# Patient Record
Sex: FEMALE | Race: WHITE | NOT HISPANIC OR LATINO | URBAN - METROPOLITAN AREA
[De-identification: names, ages, dates, MRNs, and addresses within clinical notes are randomized per-mention and may not be internally consistent; named-entity substitution may affect disease eponyms.]

---

## 2017-03-01 PROBLEM — Z00.00 ENCOUNTER FOR PREVENTIVE HEALTH EXAMINATION: Status: ACTIVE | Noted: 2017-03-01

## 2017-07-18 ENCOUNTER — OUTPATIENT (OUTPATIENT)
Dept: ADMINISTRATIVE | Facility: HOSPITAL | Age: 37
LOS: 1 days | Discharge: HOME | End: 2017-07-18

## 2017-07-18 DIAGNOSIS — O99.89 OTHER SPECIFIED DISEASES AND CONDITIONS COMPLICATING PREGNANCY, CHILDBIRTH AND THE PUERPERIUM: ICD-10-CM

## 2017-07-19 ENCOUNTER — INPATIENT (INPATIENT)
Facility: HOSPITAL | Age: 37
LOS: 1 days | Discharge: HOME | End: 2017-07-21
Attending: OBSTETRICS & GYNECOLOGY | Admitting: OBSTETRICS & GYNECOLOGY

## 2017-07-19 DIAGNOSIS — O99.89 OTHER SPECIFIED DISEASES AND CONDITIONS COMPLICATING PREGNANCY, CHILDBIRTH AND THE PUERPERIUM: ICD-10-CM

## 2017-07-24 DIAGNOSIS — Z3A.39 39 WEEKS GESTATION OF PREGNANCY: ICD-10-CM

## 2017-07-24 DIAGNOSIS — F41.9 ANXIETY DISORDER, UNSPECIFIED: ICD-10-CM

## 2017-07-24 DIAGNOSIS — Z34.83 ENCOUNTER FOR SUPERVISION OF OTHER NORMAL PREGNANCY, THIRD TRIMESTER: ICD-10-CM

## 2017-08-14 DIAGNOSIS — O26.893 OTHER SPECIFIED PREGNANCY RELATED CONDITIONS, THIRD TRIMESTER: ICD-10-CM

## 2022-06-15 VITALS — WEIGHT: 141 LBS | SYSTOLIC BLOOD PRESSURE: 124 MMHG | DIASTOLIC BLOOD PRESSURE: 76 MMHG

## 2022-09-07 ENCOUNTER — APPOINTMENT (OUTPATIENT)
Dept: OBGYN | Facility: CLINIC | Age: 42
End: 2022-09-07
Payer: COMMERCIAL

## 2022-09-07 ENCOUNTER — NON-APPOINTMENT (OUTPATIENT)
Age: 42
End: 2022-09-07

## 2022-09-07 VITALS
DIASTOLIC BLOOD PRESSURE: 58 MMHG | SYSTOLIC BLOOD PRESSURE: 91 MMHG | HEIGHT: 65 IN | BODY MASS INDEX: 25.33 KG/M2 | WEIGHT: 152 LBS

## 2022-09-07 DIAGNOSIS — Z78.9 OTHER SPECIFIED HEALTH STATUS: ICD-10-CM

## 2022-09-07 DIAGNOSIS — Z97.5 PRESENCE OF (INTRAUTERINE) CONTRACEPTIVE DEVICE: ICD-10-CM

## 2022-09-07 LAB
BILIRUB UR QL STRIP: NORMAL
CLARITY UR: CLEAR
COLLECTION METHOD: NORMAL
GLUCOSE UR-MCNC: NORMAL
HCG UR QL: 0.2 EU/DL
HGB UR QL STRIP.AUTO: NORMAL
KETONES UR-MCNC: NORMAL
LEUKOCYTE ESTERASE UR QL STRIP: NORMAL
NITRITE UR QL STRIP: NORMAL
PH UR STRIP: 6
PROT UR STRIP-MCNC: NORMAL
SP GR UR STRIP: 1.03

## 2022-09-07 PROCEDURE — 81002 URINALYSIS NONAUTO W/O SCOPE: CPT

## 2022-09-07 PROCEDURE — 0501F PRENATAL FLOW SHEET: CPT | Mod: NC

## 2022-09-07 PROCEDURE — 76816 OB US FOLLOW-UP PER FETUS: CPT

## 2022-09-07 RX ORDER — SERTRALINE HYDROCHLORIDE 25 MG/1
TABLET, FILM COATED ORAL
Refills: 0 | Status: ACTIVE | COMMUNITY

## 2022-09-07 RX ORDER — ONDANSETRON 8 MG/1
8 TABLET, ORALLY DISINTEGRATING ORAL
Refills: 0 | Status: ACTIVE | COMMUNITY

## 2022-09-07 RX ORDER — LAMOTRIGINE 25 MG/1
TABLET ORAL
Refills: 0 | Status: ACTIVE | COMMUNITY

## 2022-09-20 ENCOUNTER — NON-APPOINTMENT (OUTPATIENT)
Age: 42
End: 2022-09-20

## 2022-09-20 RX ORDER — NITROFURANTOIN (MONOHYDRATE/MACROCRYSTALS) 25; 75 MG/1; MG/1
100 CAPSULE ORAL
Qty: 10 | Refills: 0 | Status: ACTIVE | COMMUNITY
Start: 2022-09-20 | End: 1900-01-01

## 2022-10-19 ENCOUNTER — APPOINTMENT (OUTPATIENT)
Dept: ANTEPARTUM | Facility: CLINIC | Age: 42
End: 2022-10-19

## 2022-10-19 ENCOUNTER — ASOB RESULT (OUTPATIENT)
Age: 42
End: 2022-10-19

## 2022-10-19 PROCEDURE — 76805 OB US >/= 14 WKS SNGL FETUS: CPT

## 2022-11-07 ENCOUNTER — APPOINTMENT (OUTPATIENT)
Dept: OBGYN | Facility: CLINIC | Age: 42
End: 2022-11-07

## 2022-11-07 VITALS — BODY MASS INDEX: 25.96 KG/M2 | DIASTOLIC BLOOD PRESSURE: 74 MMHG | WEIGHT: 156 LBS | SYSTOLIC BLOOD PRESSURE: 111 MMHG

## 2022-11-07 PROCEDURE — 0502F SUBSEQUENT PRENATAL CARE: CPT | Mod: NC

## 2022-11-07 PROCEDURE — 81003 URINALYSIS AUTO W/O SCOPE: CPT | Mod: QW

## 2022-11-29 ENCOUNTER — APPOINTMENT (OUTPATIENT)
Dept: OBGYN | Facility: CLINIC | Age: 42
End: 2022-11-29
Payer: COMMERCIAL

## 2022-11-29 VITALS — SYSTOLIC BLOOD PRESSURE: 126 MMHG | DIASTOLIC BLOOD PRESSURE: 74 MMHG | WEIGHT: 153 LBS | BODY MASS INDEX: 25.46 KG/M2

## 2022-11-29 LAB
BILIRUB UR QL STRIP: NORMAL
GLUCOSE UR-MCNC: NORMAL
HCG UR QL: 0.2 EU/DL
HGB UR QL STRIP.AUTO: NORMAL
KETONES UR-MCNC: NORMAL
LEUKOCYTE ESTERASE UR QL STRIP: NORMAL
NITRITE UR QL STRIP: NORMAL
PH UR STRIP: 6.5
PROT UR STRIP-MCNC: NORMAL
SP GR UR STRIP: 1

## 2022-11-29 PROCEDURE — 81003 URINALYSIS AUTO W/O SCOPE: CPT | Mod: QW

## 2022-11-29 PROCEDURE — 76816 OB US FOLLOW-UP PER FETUS: CPT

## 2022-11-29 PROCEDURE — 0502F SUBSEQUENT PRENATAL CARE: CPT | Mod: NC

## 2022-12-07 ENCOUNTER — OUTPATIENT (OUTPATIENT)
Dept: OUTPATIENT SERVICES | Facility: HOSPITAL | Age: 42
LOS: 1 days | Discharge: HOME | End: 2022-12-07
Payer: COMMERCIAL

## 2022-12-07 VITALS — HEART RATE: 86 BPM | DIASTOLIC BLOOD PRESSURE: 63 MMHG | SYSTOLIC BLOOD PRESSURE: 105 MMHG

## 2022-12-07 VITALS
HEART RATE: 86 BPM | TEMPERATURE: 98 F | DIASTOLIC BLOOD PRESSURE: 63 MMHG | RESPIRATION RATE: 18 BRPM | SYSTOLIC BLOOD PRESSURE: 105 MMHG

## 2022-12-07 LAB
APPEARANCE UR: CLEAR — SIGNIFICANT CHANGE UP
BILIRUB UR-MCNC: NEGATIVE — SIGNIFICANT CHANGE UP
COLOR SPEC: YELLOW — SIGNIFICANT CHANGE UP
DIFF PNL FLD: NEGATIVE — SIGNIFICANT CHANGE UP
GLUCOSE UR QL: NEGATIVE — SIGNIFICANT CHANGE UP
KETONES UR-MCNC: NEGATIVE — SIGNIFICANT CHANGE UP
LEUKOCYTE ESTERASE UR-ACNC: NEGATIVE — SIGNIFICANT CHANGE UP
NITRITE UR-MCNC: NEGATIVE — SIGNIFICANT CHANGE UP
PH UR: 6 — SIGNIFICANT CHANGE UP (ref 5–8)
PROT UR-MCNC: SIGNIFICANT CHANGE UP
SP GR SPEC: 1.01 — SIGNIFICANT CHANGE UP (ref 1.01–1.03)
UROBILINOGEN FLD QL: SIGNIFICANT CHANGE UP

## 2022-12-07 PROCEDURE — 99213 OFFICE O/P EST LOW 20 MIN: CPT | Mod: GC

## 2022-12-07 NOTE — OB PROVIDER TRIAGE NOTE - HISTORY OF PRESENT ILLNESS
43 yo  @35w1d, ALEKS 1/10/23 dated by first trimester sonogram, presented to L&D after experiencing leakage of clear fluid since 0200. Reports she has worn a pad since which has been damp. Reports intermittent contractions earlier in the day 10/10 intensity. Reports her contractions have since resolved and does not report any discomfort. Denies vaginal bleeding. Reports good fetal movement. Reports dysuria for the past 3 days. Denies hematuria, urinary urgency. Reports 3 days of white and green vaginal discharge with mild irritation. Previously on Zoloft this pregnancy, but discontinued it 3-4 months ago. Denies fevers, chills, nausea, vomiting, diarrhea, constipation, chest pain, shortness of breath. GBS unknown.     Last BM: this morning  Last PO intake:   Last intercourse: several weeks ago

## 2022-12-07 NOTE — OB PROVIDER TRIAGE NOTE - NSOBPROVIDERNOTE_OBGYN_ALL_OB_FT
41 yo  @35w1d, GBS unknown, c/o LOF, not ruptured, BPP 8/8 with reassuring maternal and fetal status  -r/o UTI, f/u UA, UCx  -f/u GBS, vaginitis  -PTL precautions  -[facility] instructions  -PO maternal hydrations  -follow up at next scheduled appointment on Friday with OBGYN    Dr Larios and Dr Guzman aware

## 2022-12-07 NOTE — OB PROVIDER TRIAGE NOTE - NSHPPHYSICALEXAM_GEN_ALL_CORE
Physical exam:    Vital Signs Last 24 Hrs  T(F): 98.3 (07 Dec 2022 21:47), Max: 98.3 (07 Dec 2022 21:47)  HR: 86 (07 Dec 2022 21:51) (86 - 86)  BP: 105/63 (07 Dec 2022 21:51) (105/63 - 105/63)  RR: 18 (07 Dec 2022 21:47) (18 - 18)  SpO2: --    Gen: NAD, AAOx3  CV: S1S2, RRR, no M/R/G  Pulm: CTAB  Ext: no calf tenderness or edema   Abdomen: soft, gravid, nontender, with nonpalpable contractions, no CVA tenderness, no suprapubic tenderness  Speculum: mild physiologic discharge, no pooling, cervix appeared closed, no bleeding, nitrazine positive, ferning negative.     EFM: 145/mod/pos acc   toco: irregular  SVE: 0/0/-3  Sono: vertex, anterior placenta, MVP 6.4cm, BPP 8/8

## 2022-12-07 NOTE — OB PROVIDER TRIAGE NOTE - NSHPLABSRESULTS_GEN_ALL_CORE
Sonograms:  28w1d: 1164gm (33%), MVP 5.59cm, anterior, no previa, transverse, anatomy wnl    No PNC available

## 2022-12-07 NOTE — OB PROVIDER TRIAGE NOTE - ATTENDING COMMENTS
Pt seen on L&D, she is a 43 yo  @35w1d, GBS unknown, presented c/o possible LOF, she is not ruptured, BPP 8 with reassuring maternal and fetal status, not in labor  -will d/c home  -precautions rev  -f/u vaginal culture, and urine culture   -f/u in office

## 2022-12-08 DIAGNOSIS — O26.893 OTHER SPECIFIED PREGNANCY RELATED CONDITIONS, THIRD TRIMESTER: ICD-10-CM

## 2022-12-08 DIAGNOSIS — O47.03 FALSE LABOR BEFORE 37 COMPLETED WEEKS OF GESTATION, THIRD TRIMESTER: ICD-10-CM

## 2022-12-09 ENCOUNTER — APPOINTMENT (OUTPATIENT)
Dept: OBGYN | Facility: CLINIC | Age: 42
End: 2022-12-09

## 2022-12-09 LAB
CULTURE RESULTS: SIGNIFICANT CHANGE UP
GROUP B BETA STREP DNA (PCR): SIGNIFICANT CHANGE UP
SOURCE GROUP B STREP: SIGNIFICANT CHANGE UP
SPECIMEN SOURCE: SIGNIFICANT CHANGE UP

## 2022-12-13 ENCOUNTER — APPOINTMENT (OUTPATIENT)
Dept: OBGYN | Facility: CLINIC | Age: 42
End: 2022-12-13
Payer: COMMERCIAL

## 2022-12-13 VITALS — WEIGHT: 153 LBS | DIASTOLIC BLOOD PRESSURE: 78 MMHG | BODY MASS INDEX: 25.46 KG/M2 | SYSTOLIC BLOOD PRESSURE: 113 MMHG

## 2022-12-13 DIAGNOSIS — N39.0 URINARY TRACT INFECTION, SITE NOT SPECIFIED: ICD-10-CM

## 2022-12-13 LAB
BILIRUB UR QL STRIP: NORMAL
GLUCOSE UR-MCNC: NORMAL
HCG UR QL: 1 EU/DL
HGB UR QL STRIP.AUTO: NORMAL
KETONES UR-MCNC: NORMAL
LEUKOCYTE ESTERASE UR QL STRIP: NORMAL
NITRITE UR QL STRIP: POSITIVE
PH UR STRIP: 5.5
PROT UR STRIP-MCNC: NORMAL
SP GR UR STRIP: 1.03

## 2022-12-13 PROCEDURE — 81003 URINALYSIS AUTO W/O SCOPE: CPT | Mod: QW

## 2022-12-13 PROCEDURE — 0502F SUBSEQUENT PRENATAL CARE: CPT | Mod: NC

## 2022-12-13 PROCEDURE — 36415 COLL VENOUS BLD VENIPUNCTURE: CPT

## 2022-12-13 PROCEDURE — 76816 OB US FOLLOW-UP PER FETUS: CPT

## 2022-12-14 LAB
A VAGINAE DNA VAG QL NAA+PROBE: SIGNIFICANT CHANGE UP
ALBUMIN SERPL ELPH-MCNC: 3.3 G/DL
ALP BLD-CCNC: 190 U/L
ALT SERPL-CCNC: 15 U/L
ANION GAP SERPL CALC-SCNC: 11 MMOL/L
AST SERPL-CCNC: 20 U/L
BASOPHILS # BLD AUTO: 0.02 K/UL
BASOPHILS NFR BLD AUTO: 0.3 %
BILIRUB SERPL-MCNC: 0.5 MG/DL
BUN SERPL-MCNC: 4 MG/DL
BVAB2 DNA VAG QL NAA+PROBE: SIGNIFICANT CHANGE UP
C ALBICANS DNA VAG QL NAA+PROBE: NEGATIVE — SIGNIFICANT CHANGE UP
C GLABRATA DNA VAG QL NAA+PROBE: NEGATIVE — SIGNIFICANT CHANGE UP
C KRUSEI DNA VAG QL NAA+PROBE: NEGATIVE — SIGNIFICANT CHANGE UP
C LUSITANIAE DNA VAG QL NAA+PROBE: NEGATIVE — SIGNIFICANT CHANGE UP
C TRACH RRNA SPEC QL NAA+PROBE: NEGATIVE — SIGNIFICANT CHANGE UP
CALCIUM SERPL-MCNC: 10.1 MG/DL
CHLORIDE SERPL-SCNC: 99 MMOL/L
CO2 SERPL-SCNC: 20 MMOL/L
CREAT SERPL-MCNC: 0.59 MG/DL
EGFR: 117 ML/MIN/1.73M2
EOSINOPHIL # BLD AUTO: 0.06 K/UL
EOSINOPHIL NFR BLD AUTO: 0.9 %
GLUCOSE SERPL-MCNC: 84 MG/DL
HCT VFR BLD CALC: 30.6 %
HCV AB SER QL: NONREACTIVE
HCV S/CO RATIO: 0.08 S/CO
HGB BLD-MCNC: 9.6 G/DL
HIV1+2 AB SPEC QL IA.RAPID: NONREACTIVE
IMM GRANULOCYTES NFR BLD AUTO: 0.8 %
LYMPHOCYTES # BLD AUTO: 0.72 K/UL
LYMPHOCYTES NFR BLD AUTO: 11.2 %
MAN DIFF?: NORMAL
MCHC RBC-ENTMCNC: 28 PG
MCHC RBC-ENTMCNC: 31.4 GM/DL
MCV RBC AUTO: 89.2 FL
MEGA1 DNA VAG QL NAA+PROBE: SIGNIFICANT CHANGE UP
MONOCYTES # BLD AUTO: 0.43 K/UL
MONOCYTES NFR BLD AUTO: 6.7 %
N GONORRHOEA RRNA SPEC QL NAA+PROBE: NEGATIVE — SIGNIFICANT CHANGE UP
NEUTROPHILS # BLD AUTO: 5.15 K/UL
NEUTROPHILS NFR BLD AUTO: 80.1 %
PLATELET # BLD AUTO: 206 K/UL
POTASSIUM SERPL-SCNC: 3.9 MMOL/L
PROT SERPL-MCNC: 6.2 G/DL
RBC # BLD: 3.43 M/UL
RBC # FLD: 13.2 %
SODIUM SERPL-SCNC: 131 MMOL/L
T PALLIDUM AB SER QL IA: NEGATIVE
T VAGINALIS RRNA SPEC QL NAA+PROBE: NEGATIVE — SIGNIFICANT CHANGE UP
WBC # FLD AUTO: 6.43 K/UL

## 2022-12-15 ENCOUNTER — NON-APPOINTMENT (OUTPATIENT)
Age: 42
End: 2022-12-15

## 2022-12-15 LAB — BILE AC SER-MCNC: 18.5 UMOL/L

## 2022-12-16 ENCOUNTER — APPOINTMENT (OUTPATIENT)
Dept: OBGYN | Facility: CLINIC | Age: 42
End: 2022-12-16
Payer: COMMERCIAL

## 2022-12-16 ENCOUNTER — NON-APPOINTMENT (OUTPATIENT)
Age: 42
End: 2022-12-16

## 2022-12-16 VITALS — BODY MASS INDEX: 24.96 KG/M2 | DIASTOLIC BLOOD PRESSURE: 73 MMHG | WEIGHT: 150 LBS | SYSTOLIC BLOOD PRESSURE: 114 MMHG

## 2022-12-16 LAB
B-HEM STREP SPEC QL CULT: NORMAL
BACTERIA UR CULT: NORMAL
BILIRUB UR QL STRIP: NORMAL
GLUCOSE UR-MCNC: NORMAL
HCG UR QL: 2 EU/DL
HGB UR QL STRIP.AUTO: NORMAL
KETONES UR-MCNC: NORMAL
LEUKOCYTE ESTERASE UR QL STRIP: NORMAL
NITRITE UR QL STRIP: NORMAL
PH UR STRIP: 7.5
PROT UR STRIP-MCNC: 30
SP GR UR STRIP: 1.01

## 2022-12-16 PROCEDURE — 81003 URINALYSIS AUTO W/O SCOPE: CPT | Mod: QW

## 2022-12-16 PROCEDURE — 0502F SUBSEQUENT PRENATAL CARE: CPT | Mod: NC

## 2022-12-16 PROCEDURE — 76818 FETAL BIOPHYS PROFILE W/NST: CPT

## 2022-12-16 PROCEDURE — 36415 COLL VENOUS BLD VENIPUNCTURE: CPT

## 2022-12-16 RX ORDER — ONDANSETRON 4 MG/1
4 TABLET ORAL 3 TIMES DAILY
Qty: 28 | Refills: 0 | Status: ACTIVE | COMMUNITY
Start: 2022-12-16 | End: 1900-01-01

## 2022-12-16 RX ORDER — LURASIDONE HYDROCHLORIDE 40 MG/1
40 TABLET, FILM COATED ORAL
Qty: 30 | Refills: 0 | Status: DISCONTINUED | COMMUNITY
Start: 2022-06-17

## 2022-12-16 RX ORDER — URSODIOL 300 MG/1
300 CAPSULE ORAL
Qty: 30 | Refills: 0 | Status: ACTIVE | COMMUNITY
Start: 2022-12-16 | End: 1900-01-01

## 2022-12-16 RX ORDER — SERTRALINE HYDROCHLORIDE 50 MG/1
50 TABLET, FILM COATED ORAL
Qty: 30 | Refills: 0 | Status: DISCONTINUED | COMMUNITY
Start: 2022-11-22

## 2022-12-19 ENCOUNTER — INPATIENT (INPATIENT)
Facility: HOSPITAL | Age: 42
LOS: 1 days | Discharge: LEFT BEFORE DISCHARGE | End: 2022-12-21
Attending: STUDENT IN AN ORGANIZED HEALTH CARE EDUCATION/TRAINING PROGRAM | Admitting: STUDENT IN AN ORGANIZED HEALTH CARE EDUCATION/TRAINING PROGRAM
Payer: COMMERCIAL

## 2022-12-19 ENCOUNTER — APPOINTMENT (OUTPATIENT)
Dept: OBGYN | Facility: CLINIC | Age: 42
End: 2022-12-19
Payer: COMMERCIAL

## 2022-12-19 VITALS
HEIGHT: 65 IN | BODY MASS INDEX: 25.49 KG/M2 | HEART RATE: 86 BPM | DIASTOLIC BLOOD PRESSURE: 73 MMHG | SYSTOLIC BLOOD PRESSURE: 107 MMHG | WEIGHT: 153 LBS

## 2022-12-19 VITALS — HEIGHT: 66 IN | WEIGHT: 153 LBS

## 2022-12-19 DIAGNOSIS — K83.1 LIVER AND BILIARY TRACT DISORDERS IN PREGNANCY, UNSPECIFIED TRIMESTER: ICD-10-CM

## 2022-12-19 DIAGNOSIS — O09.529 SUPERVISION OF ELDERLY MULTIGRAVIDA, UNSPECIFIED TRIMESTER: ICD-10-CM

## 2022-12-19 DIAGNOSIS — Z34.90 ENCOUNTER FOR SUPERVISION OF NORMAL PREGNANCY, UNSPECIFIED, UNSPECIFIED TRIMESTER: ICD-10-CM

## 2022-12-19 DIAGNOSIS — O26.619 LIVER AND BILIARY TRACT DISORDERS IN PREGNANCY, UNSPECIFIED TRIMESTER: ICD-10-CM

## 2022-12-19 LAB
ALBUMIN SERPL ELPH-MCNC: 3.2 G/DL — LOW (ref 3.5–5.2)
ALP SERPL-CCNC: 198 U/L — HIGH (ref 30–115)
ALT FLD-CCNC: 11 U/L — SIGNIFICANT CHANGE UP (ref 0–41)
ANION GAP SERPL CALC-SCNC: 11 MMOL/L — SIGNIFICANT CHANGE UP (ref 7–14)
APPEARANCE UR: ABNORMAL
AST SERPL-CCNC: 14 U/L — SIGNIFICANT CHANGE UP (ref 0–41)
B-HEM STREP SPEC QL CULT: NORMAL
BASOPHILS # BLD AUTO: 0.01 K/UL — SIGNIFICANT CHANGE UP (ref 0–0.2)
BASOPHILS NFR BLD AUTO: 0.2 % — SIGNIFICANT CHANGE UP (ref 0–1)
BILIRUB SERPL-MCNC: 0.4 MG/DL — SIGNIFICANT CHANGE UP (ref 0.2–1.2)
BILIRUB UR QL STRIP: NORMAL
BILIRUB UR-MCNC: ABNORMAL
BUN SERPL-MCNC: 4 MG/DL — LOW (ref 10–20)
CALCIUM SERPL-MCNC: 10.2 MG/DL — SIGNIFICANT CHANGE UP (ref 8.4–10.5)
CHLORIDE SERPL-SCNC: 100 MMOL/L — SIGNIFICANT CHANGE UP (ref 98–110)
CO2 SERPL-SCNC: 21 MMOL/L — SIGNIFICANT CHANGE UP (ref 17–32)
COLOR SPEC: ABNORMAL
CREAT SERPL-MCNC: 0.6 MG/DL — LOW (ref 0.7–1.5)
DIFF PNL FLD: NEGATIVE — SIGNIFICANT CHANGE UP
EGFR: 116 ML/MIN/1.73M2 — SIGNIFICANT CHANGE UP
EOSINOPHIL # BLD AUTO: 0.11 K/UL — SIGNIFICANT CHANGE UP (ref 0–0.7)
EOSINOPHIL NFR BLD AUTO: 1.7 % — SIGNIFICANT CHANGE UP (ref 0–8)
GLUCOSE SERPL-MCNC: 82 MG/DL — SIGNIFICANT CHANGE UP (ref 70–99)
GLUCOSE UR QL: SIGNIFICANT CHANGE UP
GLUCOSE UR-MCNC: NORMAL
HCG UR QL: 1 EU/DL
HCT VFR BLD CALC: 30.6 % — LOW (ref 37–47)
HGB BLD-MCNC: 9.9 G/DL — LOW (ref 12–16)
HGB UR QL STRIP.AUTO: NORMAL
IMM GRANULOCYTES NFR BLD AUTO: 0.5 % — HIGH (ref 0.1–0.3)
KETONES UR-MCNC: NORMAL
KETONES UR-MCNC: SIGNIFICANT CHANGE UP
LEUKOCYTE ESTERASE UR QL STRIP: NORMAL
LEUKOCYTE ESTERASE UR-ACNC: ABNORMAL
LYMPHOCYTES # BLD AUTO: 1.52 K/UL — SIGNIFICANT CHANGE UP (ref 1.2–3.4)
LYMPHOCYTES # BLD AUTO: 23.9 % — SIGNIFICANT CHANGE UP (ref 20.5–51.1)
MCHC RBC-ENTMCNC: 27.4 PG — SIGNIFICANT CHANGE UP (ref 27–31)
MCHC RBC-ENTMCNC: 32.4 G/DL — SIGNIFICANT CHANGE UP (ref 32–37)
MCV RBC AUTO: 84.8 FL — SIGNIFICANT CHANGE UP (ref 81–99)
MONOCYTES # BLD AUTO: 0.28 K/UL — SIGNIFICANT CHANGE UP (ref 0.1–0.6)
MONOCYTES NFR BLD AUTO: 4.4 % — SIGNIFICANT CHANGE UP (ref 1.7–9.3)
NEUTROPHILS # BLD AUTO: 4.41 K/UL — SIGNIFICANT CHANGE UP (ref 1.4–6.5)
NEUTROPHILS NFR BLD AUTO: 69.3 % — SIGNIFICANT CHANGE UP (ref 42.2–75.2)
NITRITE UR QL STRIP: NORMAL
NITRITE UR-MCNC: NEGATIVE — SIGNIFICANT CHANGE UP
NRBC # BLD: 0 /100 WBCS — SIGNIFICANT CHANGE UP (ref 0–0)
PH UR STRIP: 6
PH UR: 6 — SIGNIFICANT CHANGE UP (ref 5–8)
PLATELET # BLD AUTO: 219 K/UL — SIGNIFICANT CHANGE UP (ref 130–400)
POTASSIUM SERPL-MCNC: 4.2 MMOL/L — SIGNIFICANT CHANGE UP (ref 3.5–5)
POTASSIUM SERPL-SCNC: 4.2 MMOL/L — SIGNIFICANT CHANGE UP (ref 3.5–5)
PROT SERPL-MCNC: 6.2 G/DL — SIGNIFICANT CHANGE UP (ref 6–8)
PROT UR STRIP-MCNC: NORMAL
PROT UR-MCNC: ABNORMAL
RBC # BLD: 3.61 M/UL — LOW (ref 4.2–5.4)
RBC # FLD: 12.9 % — SIGNIFICANT CHANGE UP (ref 11.5–14.5)
SODIUM SERPL-SCNC: 132 MMOL/L — LOW (ref 135–146)
SP GR SPEC: 1.03 — HIGH (ref 1.01–1.03)
SP GR UR STRIP: 1.01
UROBILINOGEN FLD QL: ABNORMAL
WBC # BLD: 6.36 K/UL — SIGNIFICANT CHANGE UP (ref 4.8–10.8)
WBC # FLD AUTO: 6.36 K/UL — SIGNIFICANT CHANGE UP (ref 4.8–10.8)

## 2022-12-19 PROCEDURE — 81003 URINALYSIS AUTO W/O SCOPE: CPT | Mod: QW

## 2022-12-19 PROCEDURE — 76819 FETAL BIOPHYS PROFIL W/O NST: CPT

## 2022-12-19 PROCEDURE — 0502F SUBSEQUENT PRENATAL CARE: CPT | Mod: NC

## 2022-12-19 RX ORDER — OXYTOCIN 10 UNIT/ML
333.33 VIAL (ML) INJECTION
Qty: 20 | Refills: 0 | Status: COMPLETED | OUTPATIENT
Start: 2022-12-19 | End: 2022-12-20

## 2022-12-19 RX ORDER — CITRIC ACID/SODIUM CITRATE 300-500 MG
15 SOLUTION, ORAL ORAL EVERY 6 HOURS
Refills: 0 | Status: DISCONTINUED | OUTPATIENT
Start: 2022-12-19 | End: 2022-12-20

## 2022-12-19 RX ORDER — LURASIDONE HYDROCHLORIDE 40 MG/1
40 TABLET ORAL DAILY
Refills: 0 | Status: DISCONTINUED | OUTPATIENT
Start: 2022-12-20 | End: 2022-12-20

## 2022-12-19 RX ORDER — SODIUM CHLORIDE 9 MG/ML
1000 INJECTION, SOLUTION INTRAVENOUS
Refills: 0 | Status: DISCONTINUED | OUTPATIENT
Start: 2022-12-19 | End: 2022-12-20

## 2022-12-19 RX ORDER — CHLORHEXIDINE GLUCONATE 213 G/1000ML
1 SOLUTION TOPICAL ONCE
Refills: 0 | Status: DISCONTINUED | OUTPATIENT
Start: 2022-12-19 | End: 2022-12-20

## 2022-12-19 RX ORDER — SODIUM CHLORIDE 9 MG/ML
1000 INJECTION, SOLUTION INTRAVENOUS
Refills: 0 | Status: DISCONTINUED | OUTPATIENT
Start: 2022-12-19 | End: 2022-12-19

## 2022-12-19 RX ORDER — CHLORHEXIDINE GLUCONATE 213 G/1000ML
1 SOLUTION TOPICAL ONCE
Refills: 0 | Status: DISCONTINUED | OUTPATIENT
Start: 2022-12-19 | End: 2022-12-19

## 2022-12-19 RX ORDER — INFLUENZA VIRUS VACCINE 15; 15; 15; 15 UG/.5ML; UG/.5ML; UG/.5ML; UG/.5ML
0.5 SUSPENSION INTRAMUSCULAR ONCE
Refills: 0 | Status: DISCONTINUED | OUTPATIENT
Start: 2022-12-19 | End: 2022-12-20

## 2022-12-19 RX ORDER — OXYTOCIN 10 UNIT/ML
333.33 VIAL (ML) INJECTION
Qty: 20 | Refills: 0 | Status: DISCONTINUED | OUTPATIENT
Start: 2022-12-19 | End: 2022-12-19

## 2022-12-19 RX ORDER — CITRIC ACID/SODIUM CITRATE 300-500 MG
15 SOLUTION, ORAL ORAL EVERY 6 HOURS
Refills: 0 | Status: DISCONTINUED | OUTPATIENT
Start: 2022-12-19 | End: 2022-12-19

## 2022-12-19 NOTE — OB PROVIDER H&P - NSHPPHYSICALEXAM_GEN_ALL_CORE
Vital Signs Last 24 Hrs  HR: 95 (19 Dec 2022 22:35) (82 - 95)  BP: 119/74 (19 Dec 2022 22:35) (119/74 - 123/69)    Gen: NAD, sitting comfortably  Abd: Gravid, soft, NT, no palpable ctx  SVE: 1/50/-2, vtx, intact  EFM: 125/mod/+accels  [place]: q4m  Sono: cephalic

## 2022-12-19 NOTE — OB PROVIDER H&P - NS_OBGYNHISTORY_OBGYN_ALL_OB_FT
OBhx: FT  x4, largest 6-14, no complications    Gynhx: denies fibroids, cysts, abnormal pap smears, STDs.

## 2022-12-19 NOTE — OB PROVIDER H&P - HISTORY OF PRESENT ILLNESS
41yo  @36w6d with ALEKS 1/10 by first trimester sonogram presenting to L&D for scheduled IOL for cholestasis of pregnancy. Most recent bile acids on  were 18.5. Denies VB, LOF, ctx.  Good FM.  Denies any other complications with this pregnancy. GBS neg.

## 2022-12-19 NOTE — OB PROVIDER H&P - ASSESSMENT
41yo  @36w6d, GBS neg, for IOL for cholestasis of pregnancy.    - admit to L&D  - cont efm/toco  - clear liquid diet, IVF  - pain management prn  - balloon for IOL  - f/u admission labs, COVID swab, CMP    Dr. Ruelas aware.

## 2022-12-19 NOTE — OB PROVIDER H&P - NSHPLABSRESULTS_GEN_ALL_CORE
Labs:  6/15  measles immune  mumps immune  rubella immune  varicella immune  RPR NR  O pos, antibody neg  HIV NR  HBsAg NR    12/13  HIV NR  bile acids 18.5    Sono:  10/19: 28w1d, EFW 1164g (33%), ant placenta, transverse presentation

## 2022-12-20 ENCOUNTER — APPOINTMENT (OUTPATIENT)
Dept: OBGYN | Facility: CLINIC | Age: 42
End: 2022-12-20

## 2022-12-20 LAB
AMPHET UR-MCNC: POSITIVE
BACTERIA # UR AUTO: NEGATIVE — SIGNIFICANT CHANGE UP
BARBITURATES UR SCN-MCNC: NEGATIVE — SIGNIFICANT CHANGE UP
BENZODIAZ UR-MCNC: NEGATIVE — SIGNIFICANT CHANGE UP
BLD GP AB SCN SERPL QL: SIGNIFICANT CHANGE UP
BUPRENORPHINE SCREEN, URINE RESULT: NEGATIVE — SIGNIFICANT CHANGE UP
COCAINE METAB.OTHER UR-MCNC: NEGATIVE — SIGNIFICANT CHANGE UP
COD CRY URNS QL: ABNORMAL
COMMENT - URINE: SIGNIFICANT CHANGE UP
EPI CELLS # UR: >27 /HPF — HIGH (ref 0–5)
FENTANYL UR QL: NEGATIVE — SIGNIFICANT CHANGE UP
HYALINE CASTS # UR AUTO: 58 /LPF — HIGH (ref 0–7)
L&D DRUG SCREEN, URINE: SIGNIFICANT CHANGE UP
METHADONE UR-MCNC: NEGATIVE — SIGNIFICANT CHANGE UP
OPIATES UR-MCNC: NEGATIVE — SIGNIFICANT CHANGE UP
OXYCODONE UR-MCNC: NEGATIVE — SIGNIFICANT CHANGE UP
PCP UR-MCNC: NEGATIVE — SIGNIFICANT CHANGE UP
PRENATAL SYPHILIS TEST: SIGNIFICANT CHANGE UP
PROPOXYPHENE QUALITATIVE URINE RESULT: NEGATIVE — SIGNIFICANT CHANGE UP
RBC CASTS # UR COMP ASSIST: 5 /HPF — HIGH (ref 0–4)
SARS-COV-2 RNA SPEC QL NAA+PROBE: SIGNIFICANT CHANGE UP
WBC UR QL: 9 /HPF — HIGH (ref 0–5)

## 2022-12-20 PROCEDURE — 59400 OBSTETRICAL CARE: CPT

## 2022-12-20 RX ORDER — DIPHENHYDRAMINE HCL 50 MG
25 CAPSULE ORAL EVERY 6 HOURS
Refills: 0 | Status: COMPLETED | OUTPATIENT
Start: 2022-12-20 | End: 2023-11-18

## 2022-12-20 RX ORDER — KETOROLAC TROMETHAMINE 30 MG/ML
30 SYRINGE (ML) INJECTION ONCE
Refills: 0 | Status: DISCONTINUED | OUTPATIENT
Start: 2022-12-20 | End: 2022-12-20

## 2022-12-20 RX ORDER — OXYCODONE HYDROCHLORIDE 5 MG/1
5 TABLET ORAL
Refills: 0 | Status: DISCONTINUED | OUTPATIENT
Start: 2022-12-20 | End: 2022-12-21

## 2022-12-20 RX ORDER — AER TRAVELER 0.5 G/1
1 SOLUTION RECTAL; TOPICAL EVERY 4 HOURS
Refills: 0 | Status: DISCONTINUED | OUTPATIENT
Start: 2022-12-20 | End: 2022-12-21

## 2022-12-20 RX ORDER — BUTORPHANOL TARTRATE 2 MG/ML
2 INJECTION, SOLUTION INTRAMUSCULAR; INTRAVENOUS ONCE
Refills: 0 | Status: DISCONTINUED | OUTPATIENT
Start: 2022-12-20 | End: 2022-12-20

## 2022-12-20 RX ORDER — OXYCODONE HYDROCHLORIDE 5 MG/1
5 TABLET ORAL ONCE
Refills: 0 | Status: DISCONTINUED | OUTPATIENT
Start: 2022-12-20 | End: 2022-12-21

## 2022-12-20 RX ORDER — DIPHENHYDRAMINE HCL 50 MG
25 CAPSULE ORAL EVERY 6 HOURS
Refills: 0 | Status: DISCONTINUED | OUTPATIENT
Start: 2022-12-20 | End: 2022-12-21

## 2022-12-20 RX ORDER — SODIUM CHLORIDE 9 MG/ML
3 INJECTION INTRAMUSCULAR; INTRAVENOUS; SUBCUTANEOUS EVERY 8 HOURS
Refills: 0 | Status: DISCONTINUED | OUTPATIENT
Start: 2022-12-20 | End: 2022-12-21

## 2022-12-20 RX ORDER — TETANUS TOXOID, REDUCED DIPHTHERIA TOXOID AND ACELLULAR PERTUSSIS VACCINE, ADSORBED 5; 2.5; 8; 8; 2.5 [IU]/.5ML; [IU]/.5ML; UG/.5ML; UG/.5ML; UG/.5ML
0.5 SUSPENSION INTRAMUSCULAR ONCE
Refills: 0 | Status: DISCONTINUED | OUTPATIENT
Start: 2022-12-20 | End: 2022-12-21

## 2022-12-20 RX ORDER — DIBUCAINE 1 %
1 OINTMENT (GRAM) RECTAL EVERY 6 HOURS
Refills: 0 | Status: DISCONTINUED | OUTPATIENT
Start: 2022-12-20 | End: 2022-12-21

## 2022-12-20 RX ORDER — PRAMOXINE HYDROCHLORIDE 150 MG/15G
1 AEROSOL, FOAM RECTAL EVERY 4 HOURS
Refills: 0 | Status: DISCONTINUED | OUTPATIENT
Start: 2022-12-20 | End: 2022-12-21

## 2022-12-20 RX ORDER — BENZOCAINE 10 %
1 GEL (GRAM) MUCOUS MEMBRANE EVERY 6 HOURS
Refills: 0 | Status: DISCONTINUED | OUTPATIENT
Start: 2022-12-20 | End: 2022-12-21

## 2022-12-20 RX ORDER — ACETAMINOPHEN 500 MG
975 TABLET ORAL
Refills: 0 | Status: DISCONTINUED | OUTPATIENT
Start: 2022-12-20 | End: 2022-12-21

## 2022-12-20 RX ORDER — MAGNESIUM HYDROXIDE 400 MG/1
30 TABLET, CHEWABLE ORAL
Refills: 0 | Status: DISCONTINUED | OUTPATIENT
Start: 2022-12-20 | End: 2022-12-21

## 2022-12-20 RX ORDER — IBUPROFEN 200 MG
600 TABLET ORAL EVERY 6 HOURS
Refills: 0 | Status: COMPLETED | OUTPATIENT
Start: 2022-12-20 | End: 2023-11-18

## 2022-12-20 RX ORDER — DIPHENHYDRAMINE HCL 50 MG
25 CAPSULE ORAL ONCE
Refills: 0 | Status: COMPLETED | OUTPATIENT
Start: 2022-12-20 | End: 2022-12-20

## 2022-12-20 RX ORDER — LURASIDONE HYDROCHLORIDE 40 MG/1
40 TABLET ORAL AT BEDTIME
Refills: 0 | Status: DISCONTINUED | OUTPATIENT
Start: 2022-12-20 | End: 2022-12-21

## 2022-12-20 RX ORDER — HYDROCORTISONE 1 %
1 OINTMENT (GRAM) TOPICAL EVERY 6 HOURS
Refills: 0 | Status: DISCONTINUED | OUTPATIENT
Start: 2022-12-20 | End: 2022-12-21

## 2022-12-20 RX ORDER — IBUPROFEN 200 MG
600 TABLET ORAL EVERY 6 HOURS
Refills: 0 | Status: DISCONTINUED | OUTPATIENT
Start: 2022-12-20 | End: 2022-12-21

## 2022-12-20 RX ORDER — INFLUENZA VIRUS VACCINE 15; 15; 15; 15 UG/.5ML; UG/.5ML; UG/.5ML; UG/.5ML
0.5 SUSPENSION INTRAMUSCULAR ONCE
Refills: 0 | Status: DISCONTINUED | OUTPATIENT
Start: 2022-12-20 | End: 2022-12-21

## 2022-12-20 RX ORDER — ONDANSETRON 8 MG/1
4 TABLET, FILM COATED ORAL ONCE
Refills: 0 | Status: DISCONTINUED | OUTPATIENT
Start: 2022-12-20 | End: 2022-12-20

## 2022-12-20 RX ORDER — OXYTOCIN 10 UNIT/ML
41.67 VIAL (ML) INJECTION
Qty: 20 | Refills: 0 | Status: DISCONTINUED | OUTPATIENT
Start: 2022-12-20 | End: 2022-12-21

## 2022-12-20 RX ORDER — LANOLIN
1 OINTMENT (GRAM) TOPICAL EVERY 6 HOURS
Refills: 0 | Status: DISCONTINUED | OUTPATIENT
Start: 2022-12-20 | End: 2022-12-21

## 2022-12-20 RX ORDER — OXYTOCIN 10 UNIT/ML
1 VIAL (ML) INJECTION
Qty: 30 | Refills: 0 | Status: DISCONTINUED | OUTPATIENT
Start: 2022-12-20 | End: 2022-12-20

## 2022-12-20 RX ORDER — SIMETHICONE 80 MG/1
80 TABLET, CHEWABLE ORAL EVERY 4 HOURS
Refills: 0 | Status: DISCONTINUED | OUTPATIENT
Start: 2022-12-20 | End: 2022-12-21

## 2022-12-20 RX ADMIN — Medication 30 MILLIGRAM(S): at 13:04

## 2022-12-20 RX ADMIN — Medication 975 MILLIGRAM(S): at 14:31

## 2022-12-20 RX ADMIN — Medication 975 MILLIGRAM(S): at 15:00

## 2022-12-20 RX ADMIN — BUTORPHANOL TARTRATE 2 MILLIGRAM(S): 2 INJECTION, SOLUTION INTRAMUSCULAR; INTRAVENOUS at 00:54

## 2022-12-20 RX ADMIN — Medication 25 MILLIGRAM(S): at 22:10

## 2022-12-20 RX ADMIN — SODIUM CHLORIDE 3 MILLILITER(S): 9 INJECTION INTRAMUSCULAR; INTRAVENOUS; SUBCUTANEOUS at 21:54

## 2022-12-20 RX ADMIN — Medication 600 MILLIGRAM(S): at 17:14

## 2022-12-20 RX ADMIN — Medication 25 MILLIGRAM(S): at 00:54

## 2022-12-20 RX ADMIN — Medication 1000 MILLIUNIT(S)/MIN: at 13:06

## 2022-12-20 RX ADMIN — SODIUM CHLORIDE 3 MILLILITER(S): 9 INJECTION INTRAMUSCULAR; INTRAVENOUS; SUBCUTANEOUS at 14:44

## 2022-12-20 RX ADMIN — Medication 1 TABLET(S): at 12:20

## 2022-12-20 RX ADMIN — Medication 1 MILLIUNIT(S)/MIN: at 08:59

## 2022-12-20 RX ADMIN — LURASIDONE HYDROCHLORIDE 40 MILLIGRAM(S): 40 TABLET ORAL at 22:06

## 2022-12-20 NOTE — PROGRESS NOTE ADULT - SUBJECTIVE AND OBJECTIVE BOX
Alex assessed fo rpain/bloody show. no other complaint.s    VE: 6/80/-2  EFM: cat 1  [place]: ctx q2-3min    Plan  - ivf/cld  - efm/toco  - anticipate vaginal deliveyr

## 2022-12-20 NOTE — PROCEDURE NOTE - ADDITIONAL PROCEDURE DETAILS
Sensorcaine 0.0625% Fentanyl 500 mcg/ml @ 15 ml/h Sensorcaine 0.0625% Fentanyl 500 mcg/ml @ 15 ml/h    Post Labor  Epidural/ Delivery  Evaluation Note:    Uncomplicated anesthetic for Vaginal Delivery.    Patient seen at bedside. Epidural to be removed by RN before patient transfer.  Patient moving B/L lower extremities.  Motor block appropriate and resolving. Vital Signs are stable. Pain well controlled.     Hermilo Score greater than 9    Mental Status:  __x__ Awake   ___x__ Alert   _____ Drowsy   _____ Sedated    Nausea/Vomiting:  __x__ NO  ______Yes,   See Post - Op Orders          Pain Scale (0-10):  _____    Treatment: __X__ None       Plan: Discharge:   ____Home       __X___Floor     _____Critical Care    _____

## 2022-12-20 NOTE — PROGRESS NOTE ADULT - ASSESSMENT
A/P:  43yo  @36w6d, GBS neg, for IOL for cholestasis of pregnancy, cervical ripening balloon in place.    -cont EFM/toco  -clear liquid diet, IVF  -pain management with stadol  -cont home Latuda  -f/u uds    Dr. Ruelas aware.

## 2022-12-20 NOTE — PROGRESS NOTE ADULT - SUBJECTIVE AND OBJECTIVE BOX
PGY 4 Note    Patient seen at bedside for evaluation of labor progression.  Reports some pain with contractions but no other complaints.    Vital Signs Last 24 Hrs  HR: 95 (19 Dec 2022 22:35) (82 - 95)  BP: 119/74 (19 Dec 2022 22:35) (119/74 - 123/69)      EFM: 120/mod/+accel, cat I  TOCO: q2-3m  SVE: /50/-2 @2300    Labs:                        9.9    6.36  )-----------( 219      ( 19 Dec 2022 22:55 )             30.6         132<L>  |  100  |  4<L>  ----------------------------<  82  4.2   |  21  |  0.6<L>    Ca    10.2      19 Dec 2022 22:55    TPro  6.2  /  Alb  3.2<L>  /  TBili  0.4  /  DBili  x   /  AST  14  /  ALT  11  /  AlkPhos  198<H>      ABO RH Interpretation: O POS (22 @ 22:53)    Urinalysis Basic - ( 19 Dec 2022 22:50 )    Color: Trina / Appearance: Slightly Turbid / S.035 / pH: x  Gluc: x / Ketone: Trace  / Bili: Small / Urobili: 6 mg/dL   Blood: x / Protein: 100 mg/dL / Nitrite: Negative   Leuk Esterase: Moderate / RBC: 5 /HPF / WBC 9 /HPF   Sq Epi: x / Non Sq Epi: >27 /HPF / Bacteria: Negative        Meds: chlorhexidine 2% Cloths 1 Application(s) Topical once  citric acid/sodium citrate Solution 15 milliLiter(s) Oral every 6 hours  influenza   Vaccine 0.5 milliLiter(s) IntraMuscular once  lactated ringers. 1000 milliLiter(s) IV Continuous <Continuous>  lurasidone 40 milliGRAM(s) Oral daily  oxytocin Infusion 333.333 milliUNIT(s)/Min IV Continuous <Continuous>  butorphanol Injectable 2 milliGRAM(s) IV Push once, given at 0054  diphenhydrAMINE Injectable 25 milliGRAM(s) IV Push once

## 2022-12-20 NOTE — PROGRESS NOTE ADULT - SUBJECTIVE AND OBJECTIVE BOX
pt complains of "wetness" no large gush.     VE: balloon felt in cervix, cervix palpated aroudn balloon about 3cm and 90% effaced  EFM: cat 1  [place]: ctx q3-4 min    Plan  - epidrual  - ballooon left in place  - anticipate vaginal delivery pt complains of "wetness" no large gush. nitraizne negative    VE: balloon felt in cervix, cervix palpated aroudn balloon about 3cm and 90% effaced  EFM: cat 1  [place]: ctx q3-4 min    Plan  - epidrual  - ballooon left in place  - anticipate vaginal delivery

## 2022-12-20 NOTE — OB PROVIDER DELIVERY SUMMARY - NSPROVIDERDELIVERYNOTE_OBGYN_ALL_OB_FT
Patient was fully dilated and pushed. NSD live male , Apgars 9/9. Vtx delivered  OA  ,  Fetal head restituted to LOT. The anterior and posterior shoulders delivered, followed by the remaining body atraumatically. Delayed cord clamping was performed, and then clamped and cut. Cord blood & gases collected. The  was handed to mother for skin to skin. The placenta delivered spontaneously intact with 3v cord. Uterus massaged and firm with oxytocin given IV. Cervix, vagina and perineum inspected. Repair of a small first degree perineal laceration  in the usual fashion with 3-0 chromic.   EBL -300, hemostasis assured.

## 2022-12-21 VITALS
TEMPERATURE: 97 F | RESPIRATION RATE: 18 BRPM | DIASTOLIC BLOOD PRESSURE: 60 MMHG | SYSTOLIC BLOOD PRESSURE: 118 MMHG | HEART RATE: 64 BPM

## 2022-12-21 LAB
BASOPHILS # BLD AUTO: 0.02 K/UL — SIGNIFICANT CHANGE UP (ref 0–0.2)
BASOPHILS NFR BLD AUTO: 0.2 % — SIGNIFICANT CHANGE UP (ref 0–1)
EOSINOPHIL # BLD AUTO: 0.12 K/UL — SIGNIFICANT CHANGE UP (ref 0–0.7)
EOSINOPHIL NFR BLD AUTO: 1.4 % — SIGNIFICANT CHANGE UP (ref 0–8)
HCT VFR BLD CALC: 28.7 % — LOW (ref 37–47)
HGB BLD-MCNC: 9.4 G/DL — LOW (ref 12–16)
IMM GRANULOCYTES NFR BLD AUTO: 0.6 % — HIGH (ref 0.1–0.3)
LYMPHOCYTES # BLD AUTO: 1.4 K/UL — SIGNIFICANT CHANGE UP (ref 1.2–3.4)
LYMPHOCYTES # BLD AUTO: 15.9 % — LOW (ref 20.5–51.1)
MCHC RBC-ENTMCNC: 28.1 PG — SIGNIFICANT CHANGE UP (ref 27–31)
MCHC RBC-ENTMCNC: 32.8 G/DL — SIGNIFICANT CHANGE UP (ref 32–37)
MCV RBC AUTO: 85.9 FL — SIGNIFICANT CHANGE UP (ref 81–99)
MONOCYTES # BLD AUTO: 0.3 K/UL — SIGNIFICANT CHANGE UP (ref 0.1–0.6)
MONOCYTES NFR BLD AUTO: 3.4 % — SIGNIFICANT CHANGE UP (ref 1.7–9.3)
NEUTROPHILS # BLD AUTO: 6.92 K/UL — HIGH (ref 1.4–6.5)
NEUTROPHILS NFR BLD AUTO: 78.5 % — HIGH (ref 42.2–75.2)
NRBC # BLD: 0 /100 WBCS — SIGNIFICANT CHANGE UP (ref 0–0)
PLATELET # BLD AUTO: 229 K/UL — SIGNIFICANT CHANGE UP (ref 130–400)
RBC # BLD: 3.34 M/UL — LOW (ref 4.2–5.4)
RBC # FLD: 12.9 % — SIGNIFICANT CHANGE UP (ref 11.5–14.5)
WBC # BLD: 8.81 K/UL — SIGNIFICANT CHANGE UP (ref 4.8–10.8)
WBC # FLD AUTO: 8.81 K/UL — SIGNIFICANT CHANGE UP (ref 4.8–10.8)

## 2022-12-21 RX ORDER — IBUPROFEN 200 MG
1 TABLET ORAL
Qty: 0 | Refills: 0 | DISCHARGE
Start: 2022-12-21

## 2022-12-21 RX ORDER — ACETAMINOPHEN 500 MG
3 TABLET ORAL
Qty: 0 | Refills: 0 | DISCHARGE
Start: 2022-12-21

## 2022-12-21 RX ADMIN — Medication 975 MILLIGRAM(S): at 10:00

## 2022-12-21 RX ADMIN — Medication 975 MILLIGRAM(S): at 09:22

## 2022-12-21 RX ADMIN — Medication 1 TABLET(S): at 12:45

## 2022-12-21 RX ADMIN — Medication 600 MILLIGRAM(S): at 06:02

## 2022-12-21 RX ADMIN — Medication 600 MILLIGRAM(S): at 05:09

## 2022-12-21 RX ADMIN — SODIUM CHLORIDE 3 MILLILITER(S): 9 INJECTION INTRAMUSCULAR; INTRAVENOUS; SUBCUTANEOUS at 05:27

## 2022-12-21 RX ADMIN — Medication 600 MILLIGRAM(S): at 12:45

## 2022-12-21 RX ADMIN — Medication 600 MILLIGRAM(S): at 13:15

## 2022-12-21 NOTE — DISCHARGE NOTE OB - HOSPITAL COURSE
DATE OF DISCHARGE: 22 @ 14:07    HISTORY OF PRESENT ILLNESS/HOSPITAL COURSE: HPI:  43yo  @36w6d with ALEKS 1/10 by first trimester sonogram presenting to L&D for scheduled IOL for cholestasis of pregnancy. Most recent bile acids on  were 18.5. Denies VB, LOF, ctx.  Good FM.  Denies any other complications with this pregnancy. GBS neg.   (19 Dec 2022 22:31)    PAST MEDICAL & SURGICAL HISTORY:  Anxiety      No significant past surgical history          PROCEDURES PERFORMED: Term spontaneous vaginal delivery  COMPLICATIONS:   POST PARTUM COURSE: uncomplicated, discharged home on PPD1  FINAL DIAGNOSIS:  Status post normal spontaneous vaginal delivery at Gestational Age    DISCHARGE CBC:                       9.9    6.36  )-----------( 219      ( 19 Dec 2022 22:55 )             30.6     DISCHARGE INSTRUCTIONS:  If you have severe abdominal pain, heavy vaginal bleeding, shortness of breath or chest pain please call your doctor or come to the emergency room.   DIET:  Advance as tolerated.  ACTIVITY:  Advance as tolerated.  Pelvic rest for 6 weeks.  Nothing to be inserted into the vagina for 6 weeks, i.e. no tampons, douching, sexual relations, or tub baths.   FOLLOW UP: Make an appointment to see your doctor as instructed   PRESCRIPTIONS: Prescriptions:

## 2022-12-21 NOTE — DISCHARGE NOTE OB - NS MD DC FALL RISK RISK
For information on Fall & Injury Prevention, visit: [email or web address] OR  [email or web address] OR  https://www.cdc.gov/steadi/patient.html

## 2022-12-21 NOTE — DISCHARGE NOTE OB - CARE PLAN
1 Principal Discharge DX:	Normal vaginal delivery  Assessment and plan of treatment:	vitals and labs

## 2022-12-21 NOTE — DISCHARGE NOTE OB - CARE PROVIDER_API CALL
Surinder Randolph)  Obstetrics and Gynecology  [address] [place] [address]  Phone: [phone]  Fax: [phone]  Follow Up Time:

## 2022-12-21 NOTE — PROGRESS NOTE ADULT - SUBJECTIVE AND OBJECTIVE BOX
OB attending  PPD #1    Pt doing well, pain well controlled. No overnight events, no acute complaints.    Ambulating: Yes  Voiding: Yes  Flatus: Yes  Bowel movements: Yes   Breast or bottle feeding: Breastfeeding  Diet: Regular    PAST MEDICAL & SURGICAL HISTORY:  Anxiety      No significant past surgical history          Physical Exam  Vital Signs Last 24 Hrs  T(C): 36.6 (20 Dec 2022 15:55), Max: 36.7 (20 Dec 2022 13:35)  T(F): 97.8 (20 Dec 2022 15:55), Max: 98.1 (20 Dec 2022 13:35)  HR: 81 (20 Dec 2022 15:55) (69 - 107)  BP: 110/61 (20 Dec 2022 15:55) (108/68 - 135/78)  BP(mean): --  RR: 18 (20 Dec 2022 15:55) (18 - 18)  SpO2: 82% (20 Dec 2022 11:44) (82% - 99%)      Gen: AAOx3, NAD  Abd: Soft, nontender, nondistended, BS+  Fundus: Firm, below umbilicus  Lochia: normal  Ext: No calf tenderness, no swelling    Labs:                        9.9    6.36  )-----------( 219      ( 19 Dec 2022 22:55 )             30.6     Rh+    A/P:  s/p ,IOL for cholestasis.  PPD #1, doing well  - continue current management  -d/c in am

## 2022-12-21 NOTE — CHART NOTE - NSCHARTNOTEFT_GEN_A_CORE
PGY 1 Note    Pt seen at bedside following report from nurse that patient wished to leave now and was preparing to depart the unit without being discharged. Discussed with patient that we recommend staying in the hospital for the time being, and the patient is less than 24 hours since the patient delivered, and we do not yet have a post-partum CBC to evaluate her Hg. Pt says she feels fine and does not think she needs any blood work and it not willing to wait for the blood work to be performed. She says the baby is staying in the NICU and she has other children at home that she wants to get home to for the holiday. I discussed with patient that our recommendation is for her to remain in the hospital until we have her blood work and results, and that this was also the recommendation of her PMD Dr. Randolph.     Pt declined to stay and signed out AMA.     Dr. Martinez and Dr. Randolph aware PGY 1 Note    Pt seen at bedside following report from nurse that patient wished to leave now and was preparing to depart the unit without being discharged. Discussed with patient that we recommend staying in the hospital for the time being, and the patient is less than 24 hours since the patient delivered, and we do not yet have a post-partum CBC to evaluate her Hg. Pt says she feels fine and does not think she needs any blood work and it not willing to wait for the blood work to be performed. She says the baby is staying in the NICU and she has other children at home that she wants to get home to for the holiday. I discussed with patient that our recommendation is for her to remain in the hospital until we have her blood work and results, and that this was also the recommendation of her PMD Dr. Randolph. I asked the patient if she would allow me to draw her labs now and then wait for the results before leaving the hospital. She consented to allow for a blood draw for a CBC, but said she would not wait for the results and still desired to leave right now.     CBC drawn and sent to lab. Pt declined to stay and signed out AMA.    Dr. Martinez and Dr. Randolph aware

## 2022-12-21 NOTE — DISCHARGE NOTE OB - PATIENT PORTAL LINK FT
You can access the FollowMyHealth Patient Portal offered by [facility] by registering at the following website: [email or web address]. By joining [facility]’s FollowMyHealth portal, you will also be able to view your health information using other applications (apps) compatible with our system.

## 2022-12-21 NOTE — DISCHARGE NOTE OB - MEDICATION SUMMARY - MEDICATIONS TO TAKE
Addended by: JOSE PADILLA on: 3/31/2020 10:39 AM     Modules accepted: Orders     I will START or STAY ON the medications listed below when I get home from the hospital:    acetaminophen 325 mg oral tablet  -- 3 tab(s) by mouth every 6 hours, As Needed  -- Indication: For pain    ibuprofen 600 mg oral tablet  -- 1 tab(s) by mouth every 6 hours, As Needed  -- Indication: For pain

## 2022-12-22 LAB
AMPHET UR QL SCN: NEGATIVE NG/ML — SIGNIFICANT CHANGE UP
AMPHET UR QL SCN: NEGATIVE — SIGNIFICANT CHANGE UP
AMPHETAMINE IN-HOUSE INTERPRETATION: NEGATIVE — SIGNIFICANT CHANGE UP
AMPHETAMINE, UR RESULT: NEGATIVE NG/ML — SIGNIFICANT CHANGE UP
MDA UR QL SCN: NEGATIVE NG/ML — SIGNIFICANT CHANGE UP
MDA, UR RESULT: NEGATIVE NG/ML — SIGNIFICANT CHANGE UP
MDEA, UR RESULT: NEGATIVE NG/ML — SIGNIFICANT CHANGE UP
MDMA UR QL SCN: NEGATIVE NG/ML — SIGNIFICANT CHANGE UP
MDMA, UR RESULT: NEGATIVE NG/ML — SIGNIFICANT CHANGE UP
METHAMPHET UR QL SCN: NEGATIVE NG/ML — SIGNIFICANT CHANGE UP
METHAMPHETAMINE, UR RESULT: NEGATIVE NG/ML — SIGNIFICANT CHANGE UP

## 2022-12-27 DIAGNOSIS — Z3A.36 36 WEEKS GESTATION OF PREGNANCY: ICD-10-CM

## 2022-12-27 DIAGNOSIS — K83.1 OBSTRUCTION OF BILE DUCT: ICD-10-CM

## 2022-12-27 DIAGNOSIS — Z34.83 ENCOUNTER FOR SUPERVISION OF OTHER NORMAL PREGNANCY, THIRD TRIMESTER: ICD-10-CM

## 2023-01-23 PROBLEM — F41.9 ANXIETY DISORDER, UNSPECIFIED: Chronic | Status: ACTIVE | Noted: 2022-12-19

## 2023-02-06 ENCOUNTER — APPOINTMENT (OUTPATIENT)
Dept: OBGYN | Facility: CLINIC | Age: 43
End: 2023-02-06
Payer: COMMERCIAL

## 2023-02-06 VITALS
SYSTOLIC BLOOD PRESSURE: 121 MMHG | WEIGHT: 137 LBS | HEIGHT: 65 IN | DIASTOLIC BLOOD PRESSURE: 79 MMHG | BODY MASS INDEX: 22.82 KG/M2

## 2023-02-06 DIAGNOSIS — Z86.59 PERSONAL HISTORY OF OTHER MENTAL AND BEHAVIORAL DISORDERS: ICD-10-CM

## 2023-02-06 DIAGNOSIS — Z30.430 ENCOUNTER FOR INSERTION OF INTRAUTERINE CONTRACEPTIVE DEVICE: ICD-10-CM

## 2023-02-06 LAB
BILIRUB UR QL STRIP: NORMAL
GLUCOSE UR-MCNC: NORMAL
HCG UR QL: 0.2 EU/DL
HCG UR QL: NEGATIVE
HGB UR QL STRIP.AUTO: NORMAL
KETONES UR-MCNC: NORMAL
LEUKOCYTE ESTERASE UR QL STRIP: NORMAL
NITRITE UR QL STRIP: NORMAL
PH UR STRIP: 5.5
PROT UR STRIP-MCNC: NORMAL
QUALITY CONTROL: YES
SP GR UR STRIP: 1.03

## 2023-02-06 PROCEDURE — 58300 INSERT INTRAUTERINE DEVICE: CPT

## 2023-02-06 PROCEDURE — 81003 URINALYSIS AUTO W/O SCOPE: CPT | Mod: QW

## 2023-02-06 PROCEDURE — 99212 OFFICE O/P EST SF 10 MIN: CPT | Mod: 25

## 2023-02-06 PROCEDURE — 81025 URINE PREGNANCY TEST: CPT

## 2023-02-06 PROCEDURE — 76830 TRANSVAGINAL US NON-OB: CPT

## 2023-02-06 PROCEDURE — 0503F POSTPARTUM CARE VISIT: CPT | Mod: NC

## 2023-02-07 PROBLEM — Z86.59 HISTORY OF DEPRESSION: Status: RESOLVED | Noted: 2023-02-07 | Resolved: 2023-02-07

## 2023-02-08 NOTE — PROCEDURE
[Locate IUD] : locate IUD [Retroverted] : retroverted [Not Visualized] : not visualized [IUD Placement] : intrauterine device (IUD) placement [Time out performed] : Pre-procedure time out performed.  Patient's name, date of birth and procedure confirmed. [Consent Obtained] : Consent obtained [Prevention of Pregnancy] : prevention of pregnancy [Risks] : risks [Benefits] : benefits [Alternatives] : alternatives [Patient] : patient [Infection] : infection [Bleeding] : bleeding [Pain] : pain [Expulsion] : expulsion [Failure] : failure [Uterine Perforation] : uterine perforation [Neg Pregnancy Test] : negative pregnancy test [No Premedication] : No premedication [Betadine] : Betadine [Tenaculum] : Tenaculum [Easy Passage] : Easy passage [Sounded to ___ cm] : sounded to [unfilled] ~Ucm [ParaGard IUD] : ParaGard IUD [Tolerated Well] : Patient tolerated the procedure well [No Complications] : No complications [None] : None [FreeTextEntry3] : ParaGard insertion  [FreeTextEntry5] : Rv uterus IUD in EE normal position tip in fundal region  [FreeTextEntry4] : IUD in situ  [LMPDate] : 2wks

## 2023-02-08 NOTE — PLAN
[FreeTextEntry1] : Normal PP exam , BC options reviewed, wants ParaGard IUD, r/b/a discussed, Consent signed. \par ParaGard IUD insertion and  in situ\par -Precautions reviewed \par -Pap due 2025\par -bse rev, discussed screening mammogram \par - 4-6 wks for check, then annual \par \par

## 2023-02-08 NOTE — PHYSICAL EXAM
[Chaperone Present] : A chaperone was present in the examining room during all aspects of the physical examination [Appropriately responsive] : appropriately responsive [Alert] : alert [No Acute Distress] : no acute distress [No Lymphadenopathy] : no lymphadenopathy [Soft] : soft [Non-tender] : non-tender [Non-distended] : non-distended [No HSM] : No HSM [No Lesions] : no lesions [No Mass] : no mass [Oriented x3] : oriented x3 [Examination Of The Breasts] : a normal appearance [No Masses] : no breast masses were palpable [Labia Majora] : normal [Labia Minora] : normal [Normal] : normal [Retroversion] : retroverted [Uterine Adnexae] : normal [FreeTextEntry1] : Latrice [Enlarged ___ wks] : not enlarged

## 2023-02-08 NOTE — REASON FOR VISIT
[Follow-Up] : a follow-up evaluation of [FreeTextEntry2] : postpartum exam and to discuss bc options

## 2023-02-08 NOTE — HISTORY OF PRESENT ILLNESS
[Patient reported PAP Smear was normal] : Patient reported PAP Smear was normal [FreeTextEntry1] : Pt is a , here for postpartum exam and to discuss her BC options, thinks she wants IUD. \par s/p nsd 12/20/22, baby weighed 6-5. not breast feeding, PHQ 2 negative. Got period x 1 \par \par Med/Surg Hx- depression , followed by psychiatry\par Ob/Gyn Hx- NSD x 5, largest 6-12, Pap 2022, No Hx renato/hpv  No Mammo yet \par Nkda \par Meds- Zoloft, Lamictal \par \par \par  [PapSmeardate] : 1/22

## 2023-02-08 NOTE — COUNSELING
[Nutrition/ Exercise/ Weight Management] : nutrition, exercise, weight management [Vitamins/Supplements] : vitamins/supplements [Breast Self Exam] : breast self exam [Pre/Post Op Instructions] : pre/post op instructions [Contraception/ Emergency Contraception/ Safe Sexual Practices] : contraception, emergency contraception, safe sexual practices